# Patient Record
(demographics unavailable — no encounter records)

---

## 2024-12-17 NOTE — PHYSICAL EXAM
[TextEntry] : PHYSICAL EXAM  General: The patient was alert and oriented and in no distress. Voice was normal.  Neurologic: Cranial Nerves II-XII intact PERRLA There was no significant nystagmus or disconjugate gaze noted.  EOM's: Normal  Face: The patient had no facial asymmetry or mass. The skin was unremarkable.  Ears: External ears were normal without deformity. Ear canals were normal. Tympanic membranes were intact and normal. No perforation or effusion I removed impacted cerumen from both ears under the microscope with curet, forceps   Nose:  The external nose had no significant deformity.  There was no facial tenderness.  On anterior rhinoscopy, the nasal mucosa was normal.  The anterior septum was normal. There were no visualized polyps purulence  or masses.  Oral cavity: The oral mucosa was normal. The oral and base of tongue were clear and without mass. The gingival and buccal mucosa were moist and without lesions. The palate moved well. There was no cleft palate. There appeared to be good salivary flow.   There was no pus, erythema or mass in the oral cavity/oropharynx.  Neck:  The neck was symmetrical. The parotid and submandibular glands were normal without masses. The trachea was midline and there was no unusual crepitus. Thyroid was smooth and nontender and no masses were palpated. Cervical adenopathy- none.  Pulmonary: Lungs clear to auscultation  Procedure: Fiberoptic Nasal Endoscopy  Diagnosis: Chronic sinusitis; s/p sinus surgery Examiner: Dr. Markie Max Anesthesia: Topical Lidocaine 2% and oxymetazoline  Procedure: The fiberoptic endoscope was introduced into the right nostril and passed along the floor of the nose and then  along the middle meatus. The same procedure was carried out  on the left side.  Findings:  SEPTUM:    RIGHT:  s/p sinus surgery Middle Turbinate: normal Frontal Sinus/Recess:clear Ethmoid Sinus cavity:clear Maxillary Sinus antrostomy:open Interior of maxillary of sinus:normal Spheno ethmoidal recess:clear Inferior turbinate normal  LEFT: s/p surgery Middle Turbinate: normal Frontal Sinus/Recess:clear Ethmoid Sinus cavity:pus Maxillary Sinus antrostomy:open Interior of maxillary of sinus:normal Spheno ethmoidal recess:clear Inferior turbinate normal  I cultured right left both middle meatus